# Patient Record
Sex: FEMALE | Race: WHITE | NOT HISPANIC OR LATINO | Employment: OTHER | URBAN - METROPOLITAN AREA
[De-identification: names, ages, dates, MRNs, and addresses within clinical notes are randomized per-mention and may not be internally consistent; named-entity substitution may affect disease eponyms.]

---

## 2017-03-22 ENCOUNTER — OFFICE VISIT (OUTPATIENT)
Dept: OPTOMETRY | Facility: CLINIC | Age: 62
End: 2017-03-22
Payer: COMMERCIAL

## 2017-03-22 DIAGNOSIS — H01.02B SQUAMOUS BLEPHARITIS OF UPPER AND LOWER EYELIDS OF BOTH EYES: ICD-10-CM

## 2017-03-22 DIAGNOSIS — H26.493 CLOUDY POSTERIOR CAPSULE, BILATERAL: Primary | ICD-10-CM

## 2017-03-22 DIAGNOSIS — H01.02A SQUAMOUS BLEPHARITIS OF UPPER AND LOWER EYELIDS OF BOTH EYES: ICD-10-CM

## 2017-03-22 PROCEDURE — 99213 OFFICE O/P EST LOW 20 MIN: CPT | Performed by: OPTOMETRIST

## 2017-03-22 ASSESSMENT — REFRACTION_WEARINGRX
OD_AXIS: 028
OS_SPHERE: -2.00
OD_ADD: +2.25
OS_CYLINDER: +0.50
OS_ADD: +2.25
OD_CYLINDER: +1.25
OS_AXIS: 174
OD_SPHERE: -1.25

## 2017-03-22 ASSESSMENT — REFRACTION_MANIFEST
OS_AXIS: 024
OS_SPHERE: -1.75
OS_CYLINDER: +0.75
OD_AXIS: 148
METHOD_AUTOREFRACTION: 1
OD_CYLINDER: +1.25
OD_SPHERE: -0.75

## 2017-03-22 ASSESSMENT — VISUAL ACUITY
METHOD: SNELLEN - LINEAR
OD_SC: 20/60
OS_SC: 20/80
OS_SC+: -1

## 2017-03-22 NOTE — PROGRESS NOTES
Chief Complaint   Patient presents with     Eye Problem     fuzzy vision x 3 weeks       Cataract surgery both eyes   Right eye- 8/4/16  Left eye-7/21/16    HPI    Affected eye(s):  Both   Symptoms:     Decreased vision   Dryness      Duration:  3 weeks   Frequency:  Constant       Do you have eye pain now?:  No      Comments:  Stye in od 3 weeks ago. Patient has had 4 styes since cataract surgery in 8/2016,never had styes before. Patient states vision is fuzzy for the last 3 weeks.              Lukas Kumar, OD     See Review Of Systems     HPI and ROS performed by Optometrist  scribed by Kandis Harris Optometric Assistant, A.B.O.C.    Medical, surgical and family histories reviewed and updated 3/22/2017.         OBJECTIVE: See Ophthalmology exam    ASSESSMENT:    ICD-10-CM    1. Cloudy posterior capsule, bilateral H26.493    2. Squamous blepharitis of upper and lower eyelids of both eyes H01.021     H01.022     H01.024     H01.025       PLAN:    Patient Instructions   Referral to Dr. Osorio at [facility] for Yag evaluation.    You have Demodex blepharitis. Cliradex eyelid scrubs 2 x day for 8 weeks.  These can be purchased online [email or web address] or at [employer] or Ocusoft Oust foaming eyelid cleanser.    Return as needed.    Lukas Kumar, OD

## 2017-03-22 NOTE — PROGRESS NOTES
Please call Al[facility] at [phone] to schedule your YAG proceudre.  Dr Osorio is available to do these

## 2017-03-22 NOTE — PATIENT INSTRUCTIONS
Referral to Dr. Osorio at [facility] for Yag evaluation.    You have Demodex blepharitis. Cliradex eyelid scrubs 2 x day for 8 weeks.  These can be purchased online [employer] or at Teak or Ocusoft Oust foaming eyelid cleanser.    Return as needed.    Lukas Kumar, OD      How to Use Tea Tree Oil for Eyelid Cleaning: always dilute it at least 50% with water or olive oil or coconut oil          This is how to carefully use DILUTED Tea Tree Oil on your eyelashes (or skin which has no ulcers or cuts). Ideally cleaning your eyelids with diluted Tea Tree Oil should be done after you have applied warm/hot compresses (not hot enough to burn skin).  Cotton make up pads work well to cleanse the eyelashes.       1. With clean hands, put 1 drop of Tea Tree Oil in a cup and 2-3 drops of water (or olive oil or coconut oil).  Dip cotton pad in dilution.  2. CLOSE YOUR EYES  3. Gently cleanse your closed eyelashes.  4. Leave on for about 1 minute.   5. Rinse with warm water.    Do this once in the am and once in the pm for 6- 8 weeks.      What Is YAG Capsulotomy?  YAG capsulotomy is a laser eye treatment. It is used to improve your vision after cataract surgery. Your vision can become cloudy again after cataract surgery. This is sometimes called an after cataract. But it isn t a new cataract. Instead, your posterior capsule, which holds the lens in place, becomes cloudy. Your eye doctor may use YAG capsulotomy to help you see better.        Your eye after cataract surgery  When your cataract is removed, your eye s cloudy lens is replaced with a plastic lens called an IOL (intraocular lens). The IOL is placed in the posterior capsule, which held the old cloudy lens. You can see again because the IOL lets light reach your retina. The retina is a tissue layer that lines the back of your eye.  If the capsule becomes cloudy  The posterior capsule is a thin, clear film. It can become cloudy. This can happen  months or even years after cataract surgery. This may block light from reaching your retina.    1197-6317 The [employer]. [address] [place] [address]. All rights reserved. This information is not intended as a substitute for professional medical care. Always follow your healthcare professional's instructions.        What Is Blepharitis?  Blepharitis is a redness and swelling (inflammation) of the eyelids. The membrane covering the inside of your eyelid and the white of your eye may also become inflamed. Blepharitis can be caused by germs (bacteria) on your eyelids or on the skin around your eyes. Dandruff or oily skin can also cause blepharitis. Wearing contact lenses or makeup can make your symptoms worse. Blepharitis can t always be cured. But it can be controlled.    What are the signs and symptoms?  When you have blepharitis, oil and bacteria coat your eyelids near the base of your eyelashes. This can cause:    Redness, irritation, and tearing of your eyelids    Swollen, tender eyelids    Blurred vision    Itching around your eyelashes    Greasy flakes or scales around your eyelashes    Hard crusts at the base of your eyelashes. These crusts may cause your eyelashes to fall out.  Flakes or crusts can form during the night. If this happens, it may be hard for you to open your eyes in the morning. If blepharitis is not treated promptly, it can lead to an infection at the base of an eyelash or oil gland. This infection is called a stye. Left untreated, it may become a chronic cyst. You may need surgery to remove it.    3553-8528 [employer]. [address] [place] [address]. All rights reserved. This information is not intended as a substitute for professional medical care. Always follow your healthcare professional's instructions.        Treating Blepharitis: Self-Care  To treat the problem, keep your eyelids clean. Warm compresses can reduce redness and swelling,  and help clean your eyelids, too. You may also need to wash the area gently with an eyelid scrub when you wake up.    To apply a warm compress:  1. Wash your hands with soap and warm water.  2. Wet a clean washcloth with warm water. Then wring it out.  3. Close your eyes and place the washcloth over your eyelids for 3 to 5 minutes. This helps loosen scales or crusts.  4. Wet the washcloth again as often as needed to keep it warm.  Repeat 2 or more times a day. Use a clean washcloth each time.    To use an eyelid scrub:  1. Wash your hands with soap and warm water.  2. Use a ready-made eyelid scrub. Or mix 3 drops of baby shampoo in 1/4 cup of warm water.  3. Dip a lint-free pad, cotton swab, or clean washcloth in the scrub.  4. Close one eye and gently scrub the base of the eyelid.  5. Rinse the lid in cool water and dry with a clean towel.  6. Repeat on your other eye.    4458-2691 The [employer]. [address] [place] [address], [place] [address]. All rights reserved. This information is not intended as a substitute for professional medical care. Always follow your healthcare professional's instructions.

## 2017-03-22 NOTE — MR AVS SNAPSHOT
After Visit Summary   3/22/2017    Evon Garcia    MRN: [id number]           Patient Information     Date Of Birth          1955        Visit Information        Provider Department      3/22/2017 10:00 AM Lukas Kumar, JANE [facility]        Today's Diagnoses     Cloudy posterior capsule, bilateral    -  1    Squamous blepharitis of upper and lower eyelids of both eyes          Care Instructions    Referral to Dr. Osorio at [facility] for Yag evaluation.    You have Demodex blepharitis. Cliradex eyelid scrubs 2 x day for 8 weeks.  These can be purchased online [employer] or at [employer] or Ocusoft Oust foaming eyelid cleanser.    Return as needed.    Lukas Kumar, JANE      How to Use Tea Tree Oil for Eyelid Cleaning: always dilute it at least 50% with water or olive oil or coconut oil          This is how to carefully use DILUTED Tea Tree Oil on your eyelashes (or skin which has no ulcers or cuts). Ideally cleaning your eyelids with diluted Tea Tree Oil should be done after you have applied warm/hot compresses (not hot enough to burn skin).  Cotton make up pads work well to cleanse the eyelashes.       1. With clean hands, put 1 drop of Tea Tree Oil in a cup and 2-3 drops of water (or olive oil or coconut oil).  Dip cotton pad in dilution.  2. CLOSE YOUR EYES  3. Gently cleanse your closed eyelashes.  4. Leave on for about 1 minute.   5. Rinse with warm water.    Do this once in the am and once in the pm for 6- 8 weeks.      What Is YAG Capsulotomy?  YAG capsulotomy is a laser eye treatment. It is used to improve your vision after cataract surgery. Your vision can become cloudy again after cataract surgery. This is sometimes called an after cataract. But it isn t a new cataract. Instead, your posterior capsule, which holds the lens in place, becomes cloudy. Your eye doctor may use YAG capsulotomy to help you see better.        Your eye after cataract  surgery  When your cataract is removed, your eye s cloudy lens is replaced with a plastic lens called an IOL (intraocular lens). The IOL is placed in the posterior capsule, which held the old cloudy lens. You can see again because the IOL lets light reach your retina. The retina is a tissue layer that lines the back of your eye.  If the capsule becomes cloudy  The posterior capsule is a thin, clear film. It can become cloudy. This can happen months or even years after cataract surgery. This may block light from reaching your retina.    8616-1183 [employer]. [address] [place] [address]. All rights reserved. This information is not intended as a substitute for professional medical care. Always follow your healthcare professional's instructions.        What Is Blepharitis?  Blepharitis is a redness and swelling (inflammation) of the eyelids. The membrane covering the inside of your eyelid and the white of your eye may also become inflamed. Blepharitis can be caused by germs (bacteria) on your eyelids or on the skin around your eyes. Dandruff or oily skin can also cause blepharitis. Wearing contact lenses or makeup can make your symptoms worse. Blepharitis can t always be cured. But it can be controlled.    What are the signs and symptoms?  When you have blepharitis, oil and bacteria coat your eyelids near the base of your eyelashes. This can cause:    Redness, irritation, and tearing of your eyelids    Swollen, tender eyelids    Blurred vision    Itching around your eyelashes    Greasy flakes or scales around your eyelashes    Hard crusts at the base of your eyelashes. These crusts may cause your eyelashes to fall out.  Flakes or crusts can form during the night. If this happens, it may be hard for you to open your eyes in the morning. If blepharitis is not treated promptly, it can lead to an infection at the base of an eyelash or oil gland. This infection is called a stye. Left untreated,  it may become a chronic cyst. You may need surgery to remove it.    7837-4097 The [employer]. [address] [place] [address]. All rights reserved. This information is not intended as a substitute for professional medical care. Always follow your healthcare professional's instructions.        Treating Blepharitis: Self-Care  To treat the problem, keep your eyelids clean. Warm compresses can reduce redness and swelling, and help clean your eyelids, too. You may also need to wash the area gently with an eyelid scrub when you wake up.    To apply a warm compress:  1. Wash your hands with soap and warm water.  2. Wet a clean washcloth with warm water. Then wring it out.  3. Close your eyes and place the washcloth over your eyelids for 3 to 5 minutes. This helps loosen scales or crusts.  4. Wet the washcloth again as often as needed to keep it warm.  Repeat 2 or more times a day. Use a clean washcloth each time.    To use an eyelid scrub:  1. Wash your hands with soap and warm water.  2. Use a ready-made eyelid scrub. Or mix 3 drops of baby shampoo in 1/4 cup of warm water.  3. Dip a lint-free pad, cotton swab, or clean washcloth in the scrub.  4. Close one eye and gently scrub the base of the eyelid.  5. Rinse the lid in cool water and dry with a clean towel.  6. Repeat on your other eye.    1663-7824 [employer]. [address] [place] [address]. All rights reserved. This information is not intended as a substitute for professional medical care. Always follow your healthcare professional's instructions.              Follow-ups after your visit        Your next 10 appointments already scheduled     Apr 10, 2017 11:00 AM CDT   [facility]    [address] [place] [address]   [phone]            Apr 17, 2017  8:00 AM CDT   Return Visit with Mahad Osorio MD    [facility]    [address] [address] [place] [address] [phone]              Who to contact     If you have questions or need follow up information about today's clinic visit or your schedule please contact [facility] directly at [phone].  Normal or non-critical lab and imaging results will be communicated to you by MyChart, letter or phone within 4 business days after the clinic has received the results. If you do not hear from us within 7 days, please contact the clinic through MyChart or phone. If you have a critical or abnormal lab result, we will notify you by phone as soon as possible.  Submit refill requests through [employer] or call your pharmacy and they will forward the refill request to us. Please allow 3 business days for your refill to be completed.          Additional Information About Your Visit        DoutÃ­ssima Information     Dout[employer]ssima is an electronic gateway that provides easy, online access to your medical records. With Dout[employer]­ssima, you can request a clinic appointment, read your test results, renew a prescription or communicate with your care team.     To sign up for [employer] visit the website at [email or web address]   You will be asked to enter the access code listed below, as well as some personal information. Please follow the directions to create your username and password.     Your access code is: [email or web address]  Expires: 2017 10:51 AM     Your access code will  in 90 days. If you need help or a new code, please contact your [facility] or call [phone] for assistance.        Care EveryWhere ID     This is your Care EveryWhere ID. This could be used by other organizations to access your [facility] medical records  [id number]         Blood Pressure from Last 3 Encounters:   16 140/78   16 137/82    Weight from Last 3 Encounters:   16 73 kg (161 lb)    07/20/16 73 kg (161 lb)              Today, you had the following     No orders found for display       Primary Care Provider Office Phone # Fax #    Afsaneh Mcclure PA-C [phone]       [facility] [address] [facility] [address]  [facility] [address]        Thank you!     Thank you for choosing [facility]  for your care. Our goal is always to provide you with excellent care. Hearing back from our patients is one way we can continue to improve our services. Please take a few minutes to complete the written survey that you may receive in the mail after your visit with us. Thank you!             Your Updated Medication List - Protect others around you: Learn how to safely use, store and throw away your medicines at www.disposemymeds.org.          This list is accurate as of: 3/22/17 10:51 AM.  Always use your most recent med list.                   Brand Name Dispense Instructions for use    calcium carbonate 500 MG tablet    OS-PETROS 500 mg [place]. [place]     Take 500 mg by mouth 2 times daily       ketorolac tromethamine 0.4 % Soln ophthalmic solution    ACULAR-LS    1 Bottle    Apply 1 drop to eye 4 times daily       multivitamin, therapeutic with minerals Tabs tablet      Take 1 tablet by mouth daily       prednisoLONE acetate 1 % ophthalmic susp    PRED FORTE    1 Bottle    Apply 1 drop to eye 4 times daily       VITAMIN B 12 PO      Take 1 tablet by mouth daily       VITAMIN D3 PO      Take 1 tablet by mouth daily

## 2017-04-10 ENCOUNTER — OFFICE VISIT (OUTPATIENT)
Dept: OPHTHALMOLOGY | Facility: CLINIC | Age: 62
End: 2017-04-10
Payer: COMMERCIAL

## 2017-04-10 DIAGNOSIS — Z98.890 S/P YAG CAPSULOTOMY, RIGHT: Primary | ICD-10-CM

## 2017-04-10 DIAGNOSIS — H26.493 CLOUDY POSTERIOR CAPSULE, BILATERAL: ICD-10-CM

## 2017-04-10 DIAGNOSIS — Z98.890 S/P YAG CAPSULOTOMY, LEFT: ICD-10-CM

## 2017-04-10 PROCEDURE — 66821 AFTER CATARACT LASER SURGERY: CPT | Mod: 50 | Performed by: OPHTHALMOLOGY

## 2017-04-10 RX ORDER — PREDNISOLONE ACETATE 10 MG/ML
1 SUSPENSION/ DROPS OPHTHALMIC 4 TIMES DAILY
Qty: 5 ML | Refills: 0 | Status: SHIPPED | OUTPATIENT
Start: 2017-04-10 | End: 2017-04-17

## 2017-04-10 ASSESSMENT — VISUAL ACUITY
METHOD: SNELLEN - LINEAR
OD_SC: 20/60
OS_SC: 20/80
OS_SC+: -1

## 2017-04-10 NOTE — MR AVS SNAPSHOT
After Visit Summary   4/10/2017    Evon Garcia    MRN: [id number]           Patient Information     Date Of Birth          1955        Visit Information        Provider Department      4/10/2017 11:00 AM Mahad Osorio MD [facility]        Today's Diagnoses     S/P YAG capsulotomy, right    -  1    S/P YAG capsulotomy, left        Cloudy posterior capsule, bilateral          Care Instructions    YAG Capsulotomy/Iridotomy Laser Surgery    Postoperative Instructions    Postoperative Medications: After surgery, you will use one eye drop for seven days. Which you will start these eye drops on the day of surgery. Your first dose will be given in the clinic prior to you leaving.  Prednisolone - is a steroid eye drop, used to minimize inflammation and modulate healing. It should be used 4 times daily for 1 week. It is a suspension so you will need to shake it before use.  (Name brands for Prednisilone include: Pred Forte, Omnipred,  Econopred, Durezol)  A convenient way of remembering the drops is to use them at Breakfast, Lunch, Dinner,and Bedtime.  The drops might sting a little when they are instilled, and that is normal.  Please continue any glaucoma, dry eye, or other medications you were using prior to the surgery. Wait 1-2 minutes between eye drops.  Please allow 24 to 48 hours when requesting refills, and call BEFORE you run out of  drops.  Restriction on Activities:  - You may develop a slight headache following the treatment. If desired, a pain reliever such as Ibuprofen, Advil, or Motrin may be used.  - It is fine to bathe, read, watch TV, and use the computer.  Symptoms requiring medical attention:  - Sudden onset of increased flashes and/or floaters beyond what you had prior to  leaving the surgery center.  - Persistent or increasing pain in the eye  - Sudden decrease in vision  - Nausea or vomiting  If you have any questions or concerns before or after your  surgery, please contact  Dr. Osorio s office at [phone]          Follow-ups after your visit        Your next 10 appointments already scheduled     2017  8:00 AM CDT   Return Visit with [facility]ad [facility]io MD   [facility]    [address] [place] [address] [phone]              Who to contact     If you have questions or need follow up information about today's clinic visit or your schedule please contact [facility] directly at [phone].  Normal or non-critical lab and imaging results will be communicated to you by [email or web address], letter or phone within 4 business days after the clinic has received the results. If you do not hear from us within 7 days, please contact the clinic through [email or web address] or phone. If you have a critical or abnormal lab result, we will notify you by phone as soon as possible.  Submit refill requests through [employer] or call your pharmacy and they will forward the refill request to us. Please allow 3 business days for your refill to be completed.          Additional Information About Your Visit        [email or web address] Information     [employer] is an electronic gateway that provides easy, online access to your medical records. With [employer], you can request a clinic appointment, read your test results, renew a prescription or communicate with your care team.     To sign up for [employer] visit the website at [email or web address]   You will be asked to enter the access code listed below, as well as some personal information. Please follow the directions to create your username and password.     Your access code is: [email or web address]  Expires: 2017 10:51 AM     Your access code will  in 90 days. If you need help or a new code, please contact your [facility] or call [phone] for assistance.        Care EveryWhere ID     This is your Care EveryWhere ID. This could  be used by other organizations to access your [facility] medical records  [id number]         Blood Pressure from Last 3 Encounters:   08/04/16 140/78   07/21/16 137/82    Weight from Last 3 Encounters:   08/04/16 73 kg (161 lb)   07/20/16 73 kg (161 lb)              We Performed the Following     YAG Capsulotomy OD (right eye)     YAG Capsulotomy OS (left eye)        Primary Care Provider Office Phone # Fax #    Afsaneh DAILEY APOLONIA Mcclure [phone]       [facility] [address] [facility] [address]  [facility] [address]        Thank you!     Thank you for choosing [facility]  for your care. Our goal is always to provide you with excellent care. Hearing back from our patients is one way we can continue to improve our services. Please take a few minutes to complete the written survey that you may receive in the mail after your visit with us. Thank you!             Your Updated Medication List - Protect others around you: Learn how to safely use, store and throw away your medicines at www.disposemymeds.org.          This list is accurate as of: 4/10/17 11:35 AM.  Always use your most recent med list.                   Brand Name Dispense Instructions for use    calcium carbonate 500 MG tablet    OS-PETROS 500 mg [place]. [place]     Take 500 mg by mouth 2 times daily       multivitamin, therapeutic with minerals Tabs tablet      Take 1 tablet by mouth daily       VITAMIN B 12 PO      Take 1 tablet by mouth daily       VITAMIN D3 PO      Take 1 tablet by mouth daily

## 2017-04-10 NOTE — NURSING NOTE
Patient presents with:  Referral  Yag Laser      Referring Provider:  No referring provider defined for this encounter.    HPI    Last Eye Exam:  3/22/17   Additional Referring Providers:  Dr. Kumar   Affected eye(s):  Both   Symptoms:     Blurred vision   Decreased vision      Duration:  1 month   Frequency:  Daily       Do you have eye pain now?:  No      Comments:  Feels like she need to wear glasses again.  Currently uses baby shampoo to clean OU lids.

## 2017-04-10 NOTE — PATIENT INSTRUCTIONS
YAG Capsulotomy/Iridotomy Laser Surgery    Postoperative Instructions    Postoperative Medications: After surgery, you will use one eye drop for seven days. Which you will start these eye drops on the day of surgery. Your first dose will be given in the clinic prior to you leaving.  Prednisolone - is a steroid eye drop, used to minimize inflammation and modulate healing. It should be used 4 times daily for 1 week. It is a suspension so you will need to shake it before use.  (Name brands for Prednisilone include: Pred Forte, Omnipred,  Econopred, Durezol)  A convenient way of remembering the drops is to use them at Breakfast, Lunch, Dinner,and Bedtime.  The drops might sting a little when they are instilled, and that is normal.  Please continue any glaucoma, dry eye, or other medications you were using prior to the surgery. Wait 1-2 minutes between eye drops.  Please allow 24 to 48 hours when requesting refills, and call BEFORE you run out of  drops.  Restriction on Activities:  - You may develop a slight headache following the treatment. If desired, a pain reliever such as Ibuprofen, Advil, or Motrin may be used.  - It is fine to bathe, read, watch TV, and use the computer.  Symptoms requiring medical attention:  - Sudden onset of increased flashes and/or floaters beyond what you had prior to  leaving the surgery center.  - Persistent or increasing pain in the eye  - Sudden decrease in vision  - Nausea or vomiting  If you have any questions or concerns before or after your surgery, please contact  Dr. Osorio s office at [phone]

## 2017-04-17 ENCOUNTER — OFFICE VISIT (OUTPATIENT)
Dept: OPHTHALMOLOGY | Facility: CLINIC | Age: 62
End: 2017-04-17
Payer: COMMERCIAL

## 2017-04-17 DIAGNOSIS — Z98.890 S/P YAG CAPSULOTOMY, RIGHT: Primary | ICD-10-CM

## 2017-04-17 DIAGNOSIS — H00.16 CHALAZION, LEFT: ICD-10-CM

## 2017-04-17 DIAGNOSIS — Z98.890 S/P YAG CAPSULOTOMY, LEFT: ICD-10-CM

## 2017-04-17 PROCEDURE — 99024 POSTOP FOLLOW-UP VISIT: CPT | Performed by: OPHTHALMOLOGY

## 2017-04-17 RX ORDER — DOXYCYCLINE 50 MG/1
50 TABLET ORAL 2 TIMES DAILY
Qty: 28 TABLET | Refills: 0 | Status: SHIPPED | OUTPATIENT
Start: 2017-04-17 | End: 2017-05-01

## 2017-04-17 ASSESSMENT — REFRACTION_MANIFEST
OS_AXIS: 025
OD_AXIS: 003
OD_SPHERE: -0.25
OS_SPHERE: -1.00
OS_ADD: +2.50
OS_CYLINDER: +0.50
OD_CYLINDER: +0.75
OD_ADD: +2.50

## 2017-04-17 ASSESSMENT — VISUAL ACUITY
OS_PH_SC: 20/20
OS_SC: 20/25
OD_PH_SC: 20/20-1
OD_SC: 20/30
OD_SC+: -1
METHOD: SNELLEN - LINEAR
OS_SC+: -3

## 2017-04-17 ASSESSMENT — SLIT LAMP EXAM - LIDS
COMMENTS: CHALAZION: LOWER LID
COMMENTS: NORMAL

## 2017-04-17 ASSESSMENT — TONOMETRY
IOP_METHOD: ICARE
OS_IOP_MMHG: 14
OD_IOP_MMHG: 13

## 2017-04-17 NOTE — MR AVS SNAPSHOT
After Visit Summary   2017    Evon Garcia    MRN: [id number]           Patient Information     Date Of Birth          1955        Visit Information        Provider Department      2017 8:00 AM Mahad Osorio MD [facility]        Today's Diagnoses     S/P YAG capsulotomy, right    -  1    S/P YAG capsulotomy, left        Chalazion, left           Follow-ups after your visit        Who to contact     If you have questions or need follow up information about today's clinic visit or your schedule please contact [facility] directly at [phone].  Normal or non-critical lab and imaging results will be communicated to you by MyChart, letter or phone within 4 business days after the clinic has received the results. If you do not hear from us within 7 days, please contact the clinic through MyChart or phone. If you have a critical or abnormal lab result, we will notify you by phone as soon as possible.  Submit refill requests through Job1001 or call your pharmacy and they will forward the refill request to us. Please allow 3 business days for your refill to be completed.          Additional Information About Your Visit        MyChart Information     [email or web address] is an electronic gateway that provides easy, online access to your medical records. With Job1001, you can request a clinic appointment, read your test results, renew a prescription or communicate with your care team.     To sign up for Job1001 visit the website at [email or web address]   You will be asked to enter the access code listed below, as well as some personal information. Please follow the directions to create your username and password.     Your access code is: [email or web address]  Expires: 2017 10:51 AM     Your access code will  in 90 days. If you need help or a new code, please contact your [facility] or call [phone] for  assistance.        Care EveryWhere ID     This is your Care EveryWhere ID. This could be used by other organizations to access your [facility] medical records  [id number]         Blood Pressure from Last 3 Encounters:   08/04/16 140/78   07/21/16 137/82    Weight from Last 3 Encounters:   08/04/16 73 kg (161 lb)   07/20/16 73 kg (161 lb)              We Performed the Following     POST-OP FOLLOW-UP VISIT          Today's Medication Changes          These changes are accurate as of: 4/17/17  8:36 AM.  If you have any questions, ask your nurse or doctor.               Start taking these medicines.        Dose/Directions    doxycycline 50 MG Tabs   Commonly known as:  PERIOSTAT   Used for:  Chalazion, left   Started by:  Mahad Osorio MD        Dose:  50 mg   Take 1 tablet (50 mg) by mouth 2 times daily for 14 days   Quantity:  28 tablet   Refills:  0       neomycin-polymixin-dexamethasone ophthalmic ointment   Commonly known as:  MAXITROL   Used for:  Chalazion, left   Started by:  Mahad Osorio MD        Dose:  1 g   Place 1 g Into the left eye At Bedtime   Quantity:  1 Tube   Refills:  3            Where to get your medicines      These medications were sent to [facility] [address] [place] [address]  AT [facility] & [address] , [facility] [address]     Phone:  [phone]     doxycycline 50 MG Tabs    neomycin-polymixin-dexamethasone ophthalmic ointment                Primary Care Provider Office Phone # Fax #    Shelby ASHLIE Mcclure PA-C [phone]       [facility] [address] [facility] [address]  [facility] [address]        Thank you!     Thank you for choosing [facility]  for your care. Our goal is always to provide you with excellent care. Hearing back from our patients is one way we can continue to improve our services. Please take a few minutes to complete the written survey that you may receive in the mail  after your visit with us. Thank you!             Your Updated Medication List - Protect others around you: Learn how to safely use, store and throw away your medicines at www.disposemymeds.org.          This list is accurate as of: 4/17/17  8:36 AM.  Always use your most recent med list.                   Brand Name Dispense Instructions for use    calcium carbonate 500 MG tablet    OS-PETROS 500 mg [place]. [place]     Take 500 mg by mouth 2 times daily       doxycycline 50 MG Tabs    PERIOSTAT    28 tablet    Take 1 tablet (50 mg) by mouth 2 times daily for 14 days       multivitamin, therapeutic with minerals Tabs tablet      Take 1 tablet by mouth daily       neomycin-polymixin-dexamethasone ophthalmic ointment    MAXITROL    1 Tube    Place 1 g Into the left eye At Bedtime       prednisoLONE acetate 1 % ophthalmic susp    PRED FORTE    5 mL    Place 1 drop into both eyes 4 times daily for 7 days       VITAMIN B 12 PO      Take 1 tablet by mouth daily       VITAMIN D3 PO      Take 1 tablet by mouth daily

## 2017-04-17 NOTE — PROGRESS NOTES
Assessment & Plan   Evon Garcia is a 62 year old female who presents with:   Review of systems for the eyes was negative other than the pertinent positives and negatives noted in the HPI.  History is obtained from the patient.    S/P YAG capsulotomy, right  S/P YAG capsulotomy, left  Good post op results, Rx per MR for new glasses    Chalazion, left  - neomycin-polymixin-dexamethasone (MAXITROL) ophthalmic ointment; Place 1 g Into the left eye At Bedtime  - doxycycline (PERIOSTAT) 50 MG TABS; Take 1 tablet (50 mg) by mouth 2 times daily for 14 days      Return 1 year for annual exam    Documentation for today's encounter was performed by Alaina LU  Acting as a scribe in my presence. I have reviewed and verified that it is an accurate recording of today's encounter.    Attending Physician Attestation:  Complete documentation of historical and exam elements from today's encounter can be found in the full encounter summary report (not reduplicated in this progress note).  I personally obtained the chief complaint(s) and history of present illness.  I confirmed and edited as necessary the review of systems, past medical/surgical history, family history, social history, and examination findings as documented by others; and I examined the patient myself.  I personally reviewed the relevant tests, images, and reports as documented above.  I formulated and edited as necessary the assessment and plan and discussed the findings and management plan with the patient and family. - Mahad Osorio MD

## 2017-04-17 NOTE — NURSING NOTE
Chief Complaints and History of Present Illnesses   Patient presents with     Follow Up For     Post YAG OU 04/10/17     HPI    Affected eye(s):  Both   Symptoms:     No blurred vision   No decreased vision         Do you have eye pain now?:  No      Comments:  Pt states vision is much better in both eyes but also complains of stye in the TYRON starting on 04/14/2017. Has been struggling with them since she had cataract surgery 08/2016. Has more styes than she has ever had in her entire life since then. Eye is swollen shut in the morning. Has been using baby shampoo to do lid scrubs BID. Not sure if this is helping but does get discharge off of eyes.  Yamilex Perry COA 7:59 AM April 17, 2017

## 2017-09-06 ENCOUNTER — OFFICE VISIT (OUTPATIENT)
Dept: OPTOMETRY | Facility: CLINIC | Age: 62
End: 2017-09-06
Payer: COMMERCIAL

## 2017-09-06 DIAGNOSIS — H00.024 HORDEOLUM INTERNUM OF LEFT UPPER EYELID: ICD-10-CM

## 2017-09-06 DIAGNOSIS — H01.02A SQUAMOUS BLEPHARITIS OF UPPER AND LOWER EYELIDS OF BOTH EYES: Primary | ICD-10-CM

## 2017-09-06 DIAGNOSIS — H01.02B SQUAMOUS BLEPHARITIS OF UPPER AND LOWER EYELIDS OF BOTH EYES: Primary | ICD-10-CM

## 2017-09-06 PROCEDURE — 99213 OFFICE O/P EST LOW 20 MIN: CPT | Performed by: OPTOMETRIST

## 2017-09-06 RX ORDER — NEOMYCIN POLYMYXIN B SULFATES AND DEXAMETHASONE 3.5; 10000; 1 MG/ML; [USP'U]/ML; MG/ML
1 SUSPENSION/ DROPS OPHTHALMIC 3 TIMES DAILY
Qty: 1 BOTTLE | Refills: 0 | Status: SHIPPED | OUTPATIENT
Start: 2017-09-06 | End: 2017-09-13

## 2017-09-06 RX ORDER — NEOMYCIN SULFATE, POLYMYXIN B SULFATE, AND DEXAMETHASONE 3.5; 10000; 1 MG/G; [USP'U]/G; MG/G
1 OINTMENT OPHTHALMIC AT BEDTIME
Qty: 1 TUBE | Refills: 0 | Status: SHIPPED | OUTPATIENT
Start: 2017-09-06 | End: 2020-02-03

## 2017-09-06 ASSESSMENT — CUP TO DISC RATIO
OS_RATIO: 0.25
OD_RATIO: 0.25

## 2017-09-06 ASSESSMENT — EXTERNAL EXAM - LEFT EYE: OS_EXAM: NORMAL

## 2017-09-06 ASSESSMENT — REFRACTION_WEARINGRX
OD_SPHERE: -0.25
OS_CYLINDER: +0.50
OD_CYLINDER: +0.75
OD_ADD: +2.50
OS_ADD: +2.50
OD_AXIS: 003
OS_SPHERE: -1.00
OS_AXIS: 025

## 2017-09-06 ASSESSMENT — SLIT LAMP EXAM - LIDS: COMMENTS: COLLARETTES, 1+ BLEPHARITIS

## 2017-09-06 ASSESSMENT — VISUAL ACUITY
OD_SC: 20/50
OS_SC: 20/40
METHOD: SNELLEN - LINEAR

## 2017-09-06 ASSESSMENT — TONOMETRY
IOP_METHOD: TONOPEN
OD_IOP_MMHG: 15
OS_IOP_MMHG: 13

## 2017-09-06 ASSESSMENT — EXTERNAL EXAM - RIGHT EYE: OD_EXAM: NORMAL

## 2017-09-06 ASSESSMENT — REFRACTION_MANIFEST
OS_AXIS: 025
OS_CYLINDER: +0.50
OD_CYLINDER: +0.75
OD_AXIS: 020
OD_SPHERE: -0.75
OS_SPHERE: -1.25

## 2017-09-06 NOTE — PROGRESS NOTES
Chief Complaint   Patient presents with     Eye Problem     blurry vision ou x 3-4 weeks       HPI    Affected eye(s):  Both   Symptoms:     Blurred vision      Duration:  4 weeks   Frequency:  Constant       Do you have eye pain now?:  No      Comments:  Patient also has had a sty in os eye x 3 weeks with drainage             Medical, surgical and family histories reviewed and updated 9/6/2017.       OBJECTIVE: See Ophthalmology exam    ASSESSMENT:    ICD-10-CM    1. Squamous blepharitis of upper and lower eyelids of both eyes H01.021 neomycin-polymixin-dexamethasone (MAXITROL) ophthalmic suspension    H01.022 neomycin-polymyxin-dexamethasone (MAXITROL) 3.5-46461-6.1 OINT ophthalmic ointment    H01.024     H01.025    2. Hordeolum internum of left upper eyelid H00.024       PLAN:     Patient Instructions   Maxitrol- 1 drop both eyes 3 x day for 1 week.    Then Systane Balance- 1 drop both eyes 4 x day.    Maxitrol ointment- at night left eye for 1 week.    Warm packs 2 x day.    You have Demodex blepharitis. Cliradex eyelid scrubs 2 x day for 8 weeks.  These can be purchased online [employer] or at [employer] or [employer] [facility] foaming eyelid cleanser.    Return in 4 weeks for recheck or sooner if needed.    Lukas Kumar, JANE

## 2017-09-06 NOTE — PATIENT INSTRUCTIONS
Maxitrol- 1 drop both eyes 3 x day for 1 week.    Then Systane Balance- 1 drop both eyes 4 x day.    Maxitrol ointment- at night left eye for 1 week.    Warm packs 2 x day.    You have Demodex blepharitis. Cliradex eyelid scrubs 2 x day for 8 weeks.  These can be purchased online [employer] or at [employer] or [employer] [facility] foaming eyelid cleanser.    Return in 4 weeks for recheck or sooner if needed.    Lukas Kumar, OD

## 2017-09-06 NOTE — MR AVS SNAPSHOT
After Visit Summary   9/6/2017    Evon Garcia    MRN: [id number]           Patient Information     Date Of Birth          1955        Visit Information        Provider Department      9/6/2017 3:30 PM Lukas Kumar, OD [facility]        Today's Diagnoses     Squamous blepharitis of upper and lower eyelids of both eyes    -  1    Hordeolum internum of left upper eyelid          Care Instructions    Maxitrol- 1 drop both eyes 3 x day for 1 week.    Then Systane Balance- 1 drop both eyes 4 x day.    Maxitrol ointment- at night left eye for 1 week.    Warm packs 2 x day.    You have Demodex blepharitis. Cliradex eyelid scrubs 2 x day for 8 weeks.  These can be purchased online [employer] or at [employer] or [employer] foaming eyelid cleanser.    Return in 4 weeks for recheck or sooner if needed.    Lukas Kumar, JANE            Follow-ups after your visit        Follow-up notes from your care team     Return in about 4 weeks (around 10/4/2017) for Follow Up.      Who to contact     If you have questions or need follow up information about today's clinic visit or your schedule please contact [facility] directly at [phone].  Normal or non-critical lab and imaging results will be communicated to you by RingMDhart, letter or phone within 4 business days after the clinic has received the results. If you do not hear from us within 7 days, please contact the clinic through RingMDhart or phone. If you have a critical or abnormal lab result, we will notify you by phone as soon as possible.  Submit refill requests through Ucha.se or call your pharmacy and they will forward the refill request to us. Please allow 3 business days for your refill to be completed.          Additional Information About Your Visit        Ucha.se Information     Ucha.se is an electronic gateway that provides easy, online access to your medical records. With Ucha.se, you can request a clinic  appointment, read your test results, renew a prescription or communicate with your care team.     To sign up for Woopiet visit the website at [email or web address]   You will be asked to enter the access code listed below, as well as some personal information. Please follow the directions to create your username and password.     Your access code is: [email or web address]  Expires: 2017  4:13 PM     Your access code will  in 90 days. If you need help or a new code, please contact your [facility] or call [phone] for assistance.        Care EveryWhere ID     This is your Care EveryWhere ID. This could be used by other organizations to access your [facility] medical records  [id number]         Blood Pressure from Last 3 Encounters:   16 140/78   16 137/82    Weight from Last 3 Encounters:   16 73 kg (161 lb)   16 73 kg (161 lb)              Today, you had the following     No orders found for display         Today's Medication Changes          These changes are accurate as of: 17  4:13 PM.  If you have any questions, ask your nurse or doctor.               These medicines have changed or have updated prescriptions.        Dose/Directions    * neomycin-polymixin-dexamethasone ophthalmic ointment   Commonly known as:  MAXITROL   This may have changed:  Another medication with the same name was added. Make sure you understand how and when to take each.   Used for:  Chalazion, left        Dose:  1 g   Place 1 g Into the left eye At Bedtime   Quantity:  1 Tube   Refills:  3       * neomycin-polymixin-dexamethasone ophthalmic suspension   Commonly known as:  MAXITROL   This may have changed:  You were already taking a medication with the same name, and this prescription was added. Make sure you understand how and when to take each.   Used for:  Squamous blepharitis of upper and lower eyelids of both eyes        Dose:  1 drop   Place 1 drop into both eyes  3 times daily for 7 days   Quantity:  1 Bottle   Refills:  0       * neomycin-polymyxin-dexamethasone 3.5-91293-2.1 Oint ophthalmic ointment   Commonly known as:  MAXITROL   This may have changed:  You were already taking a medication with the same name, and this prescription was added. Make sure you understand how and when to take each.   Used for:  Squamous blepharitis of upper and lower eyelids of both eyes        Dose:  1 Application   Place 1 Application Into the left eye At Bedtime   Quantity:  1 Tube   Refills:  0       * Notice:  This list has 3 medication(s) that are the same as other medications prescribed for you. Read the directions carefully, and ask your doctor or other care provider to review them with you.         Where to get your medicines      These medications were sent to [facility] [address] [place] [address]  AT [facility] & [address] , [facility] [address]     Phone:  [phone]     neomycin-polymixin-dexamethasone ophthalmic suspension    neomycin-polymyxin-dexamethasone 3.5-96411-5.1 Oint ophthalmic ointment                Primary Care Provider Office Phone # Fax #    Shelby W APOLONIA Mcclure [phone]       [facility] [address] [facility] [address]  [facility] [address]        Equal Access to Services     LETTY WELCH AH: Hadii gavino mayorga hadasho Soomaali, waaxda luqadaha, qaybta kaalmada adeegyada, ryne kenney. So [facility] [phone].    ATENCIÓN: Si habla español, tiene a stephens disposición servicios gratuitos de asistencia lingüística. [facility] [phone].    We comply with applicable federal civil rights laws and [place] laws. We do not discriminate on the basis of race, color, national origin, age, disability sex, sexual orientation or gender identity.            Thank you!     Thank you for choosing [facility]  for your care. Our goal is always to provide you with excellent care.  Hearing back from our patients is one way we can continue to improve our services. Please take a few minutes to complete the written survey that you may receive in the mail after your visit with us. Thank you!             Your Updated Medication List - Protect others around you: Learn how to safely use, store and throw away your medicines at www.disposemymeds.org.          This list is accurate as of: 9/6/17  4:13 PM.  Always use your most recent med list.                   Brand Name Dispense Instructions for use Diagnosis    calcium carbonate 1250 MG tablet    OS-PETROS 500 mg [place]. [place]     Take 500 mg by mouth 2 times daily        multivitamin, therapeutic with minerals Tabs tablet      Take 1 tablet by mouth daily        * neomycin-polymixin-dexamethasone ophthalmic ointment    MAXITROL    1 Tube    Place 1 g Into the left eye At Bedtime    Chalazion, left       * neomycin-polymixin-dexamethasone ophthalmic suspension    MAXITROL    1 Bottle    Place 1 drop into both eyes 3 times daily for 7 days    Squamous blepharitis of upper and lower eyelids of both eyes       * neomycin-polymyxin-dexamethasone 3.5-39299-1.1 Oint ophthalmic ointment    MAXITROL    1 Tube    Place 1 Application Into the left eye At Bedtime    Squamous blepharitis of upper and lower eyelids of both eyes       VITAMIN B 12 PO      Take 1 tablet by mouth daily        VITAMIN D3 PO      Take 1 tablet by mouth daily        * Notice:  This list has 3 medication(s) that are the same as other medications prescribed for you. Read the directions carefully, and ask your doctor or other care provider to review them with you.

## 2019-11-20 NOTE — PROGRESS NOTES
Assessment & Plan   Evon Garcia is a 62 year old female who presents with:   Review of systems for the eyes was negative other than the pertinent positives and negatives noted in the HPI.  History is obtained from the patient.     S/P YAG capsulotomy, right  - prednisoLONE acetate (PRED FORTE) 1 % ophthalmic susp; Place 1 drop into both eyes 4 times daily for 7 days    S/P YAG capsulotomy, left  - prednisoLONE acetate (PRED FORTE) 1 % ophthalmic susp; Place 1 drop into both eyes 4 times daily for 7 days    Cloudy posterior capsule, bilateral  - YAG Capsulotomy OD (right eye)  - YAG Capsulotomy OS (left eye)      Return in 1 wk for recheck    Documentation for today's encounter was performed by Alaina GOMEZ.  Acting as a scribe in my presence. I have reviewed and verified that it is an accurate recording of today's encounter.    Attending Physician Attestation:  Complete documentation of historical and exam elements from today's encounter can be found in the full encounter summary report (not reduplicated in this progress note).  I personally obtained the chief complaint(s) and history of present illness.  I confirmed and edited as necessary the review of systems, past medical/surgical history, family history, social history, and examination findings as documented by others; and I examined the patient myself.  I personally reviewed the relevant tests, images, and reports as documented above.  I formulated and edited as necessary the assessment and plan and discussed the findings and management plan with the patient and family. - Mahad Osorio MD     [place]ian

## 2020-02-03 ENCOUNTER — OFFICE VISIT (OUTPATIENT)
Dept: OPHTHALMOLOGY | Facility: CLINIC | Age: 65
End: 2020-02-03
Payer: COMMERCIAL

## 2020-02-03 DIAGNOSIS — H01.006 BLEPHARITIS OF BOTH EYES, UNSPECIFIED EYELID, UNSPECIFIED TYPE: Primary | ICD-10-CM

## 2020-02-03 DIAGNOSIS — H01.003 BLEPHARITIS OF BOTH EYES, UNSPECIFIED EYELID, UNSPECIFIED TYPE: Primary | ICD-10-CM

## 2020-02-03 PROCEDURE — 92014 COMPRE OPH EXAM EST PT 1/>: CPT | Performed by: OPHTHALMOLOGY

## 2020-02-03 RX ORDER — CYCLOSPORINE 0.5 MG/ML
1 EMULSION OPHTHALMIC 2 TIMES DAILY
Qty: 2 BOX | Refills: 3 | Status: CANCELLED | OUTPATIENT
Start: 2020-02-03

## 2020-02-03 ASSESSMENT — REFRACTION_MANIFEST
OS_AXIS: 020
OS_SPHERE: -1.75
OD_CYLINDER: +0.50
OD_ADD: +2.50
OD_SPHERE: +0.00
OS_ADD: +2.50
OS_CYLINDER: +0.50
OD_AXIS: 170

## 2020-02-03 ASSESSMENT — VISUAL ACUITY
OS_SC+: -2
METHOD: SNELLEN - LINEAR
OD_SC: 20/25
OD_SC+: -1
OS_SC: 20/30

## 2020-02-03 ASSESSMENT — SLIT LAMP EXAM - LIDS: COMMENTS: COLLARETTES, 1+ BLEPHARITIS

## 2020-02-03 ASSESSMENT — CONF VISUAL FIELD
METHOD: COUNTING FINGERS
OS_NORMAL: 1
OD_NORMAL: 1

## 2020-02-03 ASSESSMENT — TONOMETRY
IOP_METHOD: ICARE
OS_IOP_MMHG: 14
OD_IOP_MMHG: 15

## 2020-02-03 ASSESSMENT — EXTERNAL EXAM - RIGHT EYE: OD_EXAM: NORMAL

## 2020-02-03 ASSESSMENT — EXTERNAL EXAM - LEFT EYE: OS_EXAM: NORMAL

## 2020-02-03 ASSESSMENT — CUP TO DISC RATIO
OS_RATIO: 0.25
OD_RATIO: 0.25

## 2020-02-03 NOTE — NURSING NOTE
"Chief Complaints and History of Present Illnesses   Patient presents with     Annual Eye Exam       Chief Complaint(s) and History of Present Illness(es)     Annual Eye Exam               Comments     Patient having a lot of trouble with glare during night driving.  Wears OTC readers, \"anything really works\"                Elisa Xavier, COA    "

## 2020-04-08 NOTE — PROGRESS NOTES
Assessment & Plan   Evon Garcia is a 65 year old female who presents with:   Review of systems for the eyes was negative other than the pertinent positives and negatives noted in the HPI.    Blepharitis of both eyes, unspecified eyelid, unspecified type  - /      Attending Physician Attestation:  Complete documentation of historical and exam elements from today's encounter can be found in the full encounter summary report (not reduplicated in this progress note).  I personally obtained the chief complaint(s) and history of present illness.  I confirmed and edited as necessary the review of systems, past medical/surgical history, family history, social history, and examination findings as documented by others; and I examined the patient myself.  I personally reviewed the relevant tests, images, and reports as documented above.  I formulated and edited as necessary the assessment and plan and discussed the findings and management plan with the patient and family. - Mahad Osorio MD

## 2021-10-06 ENCOUNTER — TELEPHONE (OUTPATIENT)
Dept: OPTOMETRY | Facility: CLINIC | Age: 66
End: 2021-10-06

## 2021-10-06 NOTE — TELEPHONE ENCOUNTER
lvm on mobile that if she still wanted to come on Friday with Damian, we could put her on the schedule for 12:40 with Dr. Wood.  Please force the appt in and put ok per Eddie.    I also left my cell number as my message may have been confusing.    Eddie Tor  Clinic Manager

## 2021-10-08 ENCOUNTER — OFFICE VISIT (OUTPATIENT)
Dept: OPTOMETRY | Facility: CLINIC | Age: 66
End: 2021-10-08
Payer: COMMERCIAL

## 2021-10-08 DIAGNOSIS — Z96.1 PSEUDOPHAKIA OF BOTH EYES: Primary | ICD-10-CM

## 2021-10-08 DIAGNOSIS — H52.4 PRESBYOPIA: ICD-10-CM

## 2021-10-08 PROCEDURE — 92004 COMPRE OPH EXAM NEW PT 1/>: CPT | Performed by: OPTOMETRIST

## 2021-10-08 PROCEDURE — 92015 DETERMINE REFRACTIVE STATE: CPT | Performed by: OPTOMETRIST

## 2021-10-08 ASSESSMENT — VISUAL ACUITY
OD_CC+: -2
OD_CC: 20/20
OS_CC: 20/60
METHOD: SNELLEN - LINEAR

## 2021-10-08 ASSESSMENT — CUP TO DISC RATIO
OS_RATIO: 0.25
OD_RATIO: 0.25

## 2021-10-08 ASSESSMENT — TONOMETRY
OD_IOP_MMHG: 12
IOP_METHOD: ICARE
OS_IOP_MMHG: 11

## 2021-10-08 ASSESSMENT — CONF VISUAL FIELD
METHOD: COUNTING FINGERS
OS_NORMAL: 1
OD_NORMAL: 1

## 2021-10-08 ASSESSMENT — SLIT LAMP EXAM - LIDS: COMMENTS: COLLARETTES, 1+ BLEPHARITIS

## 2021-10-08 ASSESSMENT — REFRACTION_MANIFEST
OD_ADD: +2.50
OS_SPHERE: -1.50
OS_CYLINDER: +0.50
OD_AXIS: 170
OS_ADD: +2.50
OD_SPHERE: -0.25
OS_AXIS: 010
OD_CYLINDER: +0.50

## 2021-10-08 ASSESSMENT — EXTERNAL EXAM - LEFT EYE: OS_EXAM: NORMAL

## 2021-10-08 ASSESSMENT — EXTERNAL EXAM - RIGHT EYE: OD_EXAM: NORMAL

## 2021-10-08 NOTE — NURSING NOTE
Chief Complaints and History of Present Illnesses   Patient presents with     Annual Eye Exam       Chief Complaint(s) and History of Present Illness(es)     Annual Eye Exam     Laterality: both eyes    Quality: blurred vision    Timing: when reading    Associated symptoms: Negative for eye pain, flashes and floaters              Comments     Patient here for annual eye exam. Only uses readers, whatever strength she grabs at the [employer] store. Patient states that her eyes get sore and tired from reading. When this happens she just thinks its time to stop reading. For the past 6 months has been getting small bumps on her LLL, uses warm compress and they go away for a short period of time and then come back again.     No Pain, No Flashes, No Floaters    Cataract surgery 2016.                 Claude Tyler, Ophthalmic Assistant

## 2021-10-08 NOTE — PROGRESS NOTES
Assessment/Plan  (Z96.1) Pseudophakia of both eyes  (primary encounter diagnosis)  Comment: Surgery with Dr. Osorio in the past. Open capsule.   Plan: Monitor annually for now.     (H52.4) Presbyopia  Comment: Minimal distance refraction. Monovision.    Plan: REFRACTION [[id number]]        Discussed findings with patient. New spectacle prescription dispensed to patient. Patient is welcome to return to clinic with prolonged adaptation difficulties.       Complete documentation of historical and exam elements from today's encounter can  be found in the full encounter summary report (not reduplicated in this progress  note). I personally obtained the chief complaint(s) and history of present illness. I  confirmed and edited as necessary the review of systems, past medical/surgical  history, family history, social history, and examination findings as documented by  others; and I examined the patient myself. I personally reviewed the relevant tests,  images, and reports as documented above. I formulated and edited as necessary the  assessment and plan and discussed the findings and management plan with the  patient and family.    Elder Wood, OD